# Patient Record
Sex: MALE | Race: WHITE
[De-identification: names, ages, dates, MRNs, and addresses within clinical notes are randomized per-mention and may not be internally consistent; named-entity substitution may affect disease eponyms.]

---

## 2019-08-12 ENCOUNTER — HOSPITAL ENCOUNTER (OUTPATIENT)
Dept: HOSPITAL 11 - JP.SDS | Age: 54
Discharge: HOME | End: 2019-08-12
Attending: SURGERY
Payer: COMMERCIAL

## 2019-08-12 DIAGNOSIS — F32.9: ICD-10-CM

## 2019-08-12 DIAGNOSIS — E78.5: ICD-10-CM

## 2019-08-12 DIAGNOSIS — Z86.711: ICD-10-CM

## 2019-08-12 DIAGNOSIS — K21.9: ICD-10-CM

## 2019-08-12 DIAGNOSIS — K81.1: Primary | ICD-10-CM

## 2019-08-12 DIAGNOSIS — I89.8: ICD-10-CM

## 2019-08-12 DIAGNOSIS — I10: ICD-10-CM

## 2019-08-12 DIAGNOSIS — Z79.01: ICD-10-CM

## 2019-08-12 DIAGNOSIS — Z79.899: ICD-10-CM

## 2019-08-12 PROCEDURE — 80076 HEPATIC FUNCTION PANEL: CPT

## 2019-08-12 PROCEDURE — 85610 PROTHROMBIN TIME: CPT

## 2019-08-12 PROCEDURE — 87070 CULTURE OTHR SPECIMN AEROBIC: CPT

## 2019-08-12 PROCEDURE — 87075 CULTR BACTERIA EXCEPT BLOOD: CPT

## 2019-08-12 PROCEDURE — 87205 SMEAR GRAM STAIN: CPT

## 2019-08-12 PROCEDURE — 47562 LAPAROSCOPIC CHOLECYSTECTOMY: CPT

## 2019-08-12 PROCEDURE — 36415 COLL VENOUS BLD VENIPUNCTURE: CPT

## 2019-08-12 NOTE — [id number]
**Discharge Summary





- Hospital Course


Free Text/Narrative:: 





This 54 year old white male complains of upper abdominal pain.  CT of his 

abdomen and pelvis showed no cholelithiasis and no ductal dilatation.  A CCK 

stimulated HIDA showed a low ejection fraction of 16% consistent with biliary 

dyskinesia and chronic cholelithiasis.  His LFT's are unremarkable.  He is 

admitted for a laparoscopic cholecystectomy.  This was done this morning.  He 

currently is eating well, feels well and wants to go home.  He is discharged at 

this time in good condition. 


Brief History: See above narrative.


Diagnosis: Stroke: No





- Discharge Data


Discharge Date: 08/12/19


Discharge Disposition: Home, Self-Care 01


Condition: Good





- Discharge Diagnosis/Problem(s)


(1) Biliary dyskinesia


SNOMED Code(s): 194421389


   ICD Code: K82.8 - OTHER SPECIFIED DISEASES OF GALLBLADDER   Status: Acute   

Current Visit: Yes   





- Patient Summary/Data


Operative Procedure(s) Performed: Laparoscopic cholecystectomy


Consults: 


 Consultations





08/12/19 11:23


Respiratory Care Assess and Treatment [CONS] Routine 


   Comment: 


   Physician Instructions: Post-Op Pneumonia Prevention











Hospital Course: 





See above narrative. 





- Patient Instructions


Diet: Usual Diet as Tolerated


Activity: As Tolerated (Avoid activity that causes discomfort)


Driving, Other: Do not drive while taking narcotic pain medication. 


Showering/Bathing: No Tub Bathing/Swimming, Shower in AM


Notify Provider of: Fever, Increased Pain, Swelling and Redness, Drainage, 

Nausea and/or Vomiting





- Discharge Plan


*PRESCRIPTION DRUG MONITORING PROGRAM REVIEWED*: No


*COPY OF PRESCRIPTION DRUG MONITORING REPORT IN PATIENT KHALIF: No


Prescriptions/Med Rec: 


Acetaminophen/HYDROcodone [Norco 325-5 MG] 2 tab PO Q4H PRN #30 tablet


 PRN Reason: Pain (Moderate 4-6)


Home Medications: 


 Home Meds





Lisinopril [Prinivil] 20 mg PO DAILY 08/08/19 [History]


Warfarin [Coumadin] 10 mg PO DAILY 08/08/19 [History]


Acetaminophen/HYDROcodone [Norco 325-5 MG] 2 tab PO Q4H PRN #30 tablet 08/12/19 

[Rx]








Referrals: 


Erwin Busby MD [Physician] - 





- Discharge Summary/Plan Comment


DC Time >30 min.: Yes





- General Info


Date of Service: 08/12/19


Admission Dx/Problem (Free Text: 





Chronic cholecystitis and biliary dyskinesia. 


Subjective Update: 





Doing fine.  He wants to go home. 





- Review of Systems


General: Reports: No Symptoms


HEENT: Reports: No Symptoms


Pulmonary: Reports: No Symptoms


Cardiovascular: Reports: No Symptoms


Gastrointestinal: Reports: Abdominal Pain (He says it is not bad. )


Genitourinary: Reports: No Symptoms


Musculoskeletal: Reports: No Symptoms


Skin: Reports: No Symptoms


Neurological: Reports: No Symptoms


Psychiatric: Reports: No Symptoms





- Patient Data


Vitals - Most Recent: 


 Last Vital Signs











Temp  98 F   08/12/19 15:56


 


Pulse  70   08/12/19 15:56


 


Resp  16   08/12/19 15:56


 


BP  122/72   08/12/19 15:56


 


Pulse Ox  97   08/12/19 15:56











Weight - Most Recent: 240 lb


I&O - Last 24 hours: 


 Intake & Output











 08/12/19 08/12/19 08/12/19





 06:59 14:59 22:59


 


Intake Total   844


 


Balance   844











Lab Results - Last 24 hrs: 


 Laboratory Results - last 24 hr











  08/12/19 08/12/19 Range/Units





  08:11 08:11 


 


PT  12.3 H   (9.5-12.0)  sec


 


INR  1.15  D   (0.80-1.20)  


 


Total Bilirubin   0.7  (0.2-1.0)  mg/dL


 


Direct Bilirubin   0.11  (0.0-0.2)  mg/dL


 


Indirect Bilirubin   0.59  


 


AST   22  (15-37)  U/L


 


ALT   29  (12-78)  U/L


 


Alkaline Phosphatase   65  ()  U/L


 


Total Protein   7.7  (6.4-8.2)  g/dL


 


Albumin   4.0  (3.4-5.0)  g/dL


 


Globulin   3.7 H  (2.3-3.5)  g/dL


 


Albumin/Globulin Ratio   1.1 L  (1.2-2.2)  











NASIR Results - Last 24 hrs: 


 Microbiology











 08/12/19 10:52 Gram Stain - Final





 Gallbladder Fluid - Bile 











Med Orders - Current: 


 Current Medications





Hydrocodone Bitart/Acetaminophen (Norco 325-5 Mg)  2 tab PO Q4H PRN


   PRN Reason: Pain (moderate 4-6)


   Last Admin: 08/12/19 15:46 Dose:  2 tab


Fentanyl (Sublimaze)  50 mcg IVPUSH Q1H PRN


   PRN Reason: Pain (severe 7-10)


   Last Admin: 08/12/19 11:40 Dose:  50 mcg


Hydroxyzine HCl (Vistaril)  50 mg IM Q4H PRN


   PRN Reason: Nausea


Dextrose/Lactated Ringer's (Dextrose 5%-Lactated Ringers)  1,000 mls @ 100 mls/

hr IV ASDIRECTED [facility]


   Last Admin: 08/12/19 08:29 Dose:  100 mls/hr


Lactated Ringer's (Ringers, Lactated)  1,000 mls @ 125 mls/hr IV ASDIRECTED [facility]


   Last Admin: 08/12/19 12:17 Dose:  125 mls/hr


Lisinopril (Prinivil)  20 mg PO DAILY [facility]


Ondansetron HCl (Zofran)  4 mg IVPUSH Q6H PRN


   PRN Reason: Nausea/Vomiting





Discontinued Medications





Bupivacaine HCl (Marcaine 0.5%) Confirm Administered Dose 50 ml .ROUTE .STK-MED 

ONE


   Stop: 08/12/19 09:06


   Last Admin: 08/12/19 10:41 Dose:  20 ml


Bupivacaine HCl/Epinephrine Bitart (Marcaine 0.5%/Epinephrine 1:200,000) 

Confirm Administered Dose 50 ml .ROUTE .STK-MED ONE


   Stop: 08/12/19 09:04


Dexamethasone (Dexamethasone) Confirm Administered Dose 4 mg .ROUTE .STK-MED ONE


   Stop: 08/12/19 10:01


Fentanyl (Sublimaze) Confirm Administered Dose 250 mcg .ROUTE .STK-MED ONE


   Stop: 08/12/19 10:00


Fentanyl (Sublimaze)  50 mcg IVPUSH ONETIME ONE


   Stop: 08/12/19 11:38


   Last Admin: 08/12/19 14:10 Dose:  Not Given


Glycopyrrolate (Robinul) Confirm Administered Dose 1 mg .ROUTE .STK-MED ONE


   Stop: 08/12/19 10:01


Hydroxyzine HCl (Vistaril)  100 mg IM ONETIME ONE


   Stop: 08/12/19 11:28


   Last Admin: 08/12/19 11:33 Dose:  100 mg


Cefoxitin Sodium 2 gm/ Sodium (Chloride)  50 mls @ 100 mls/hr IV ONETIME ONE


   Stop: 08/12/19 09:14


   Last Admin: 08/12/19 12:16 Dose:  Not Given


Lactated Ringer's (Ringers, Lactated) Confirm Administered Dose 1,000 mls @ as 

directed .ROUTE [email or web address] ONE


   Stop: 08/12/19 10:51


Lidocaine/Epinephrine (Xylocaine 1% With Epinephrine 1:100,000) Confirm 

Administered Dose 50 ml .ROUTE .[email or web address] ONE


   Stop: 08/12/19 09:06


   Last Admin: 08/12/19 10:42 Dose:  20 ml


Neostigmine Methylsulfate (Neostigmine) Confirm Administered Dose 5 mg .ROUTE 

[email or web address] ONE


   Stop: 08/12/19 10:01


Ondansetron HCl (Zofran) Confirm Administered Dose 4 mg .ROUTE .[email or web address] ONE


   Stop: 08/12/19 10:01


Propofol (Diprivan  20 Ml) Confirm Administered Dose 200 mg .ROUTE .[email or web address] ONE


   Stop: 08/12/19 10:01


Rocuronium Bromide (Zemuron) Confirm Administered Dose 50 mg .ROUTE .[email or web address] ONE


   Stop: 08/12/19 10:01


Succinylcholine Chloride (Quelicin) Confirm Administered Dose 200 mg .ROUTE [email or web address]

-MED ONE


   Stop: 08/12/19 10:01











- Exam


General: Reports: Alert, Oriented, Cooperative, No Acute Distress


Lungs: Reports: Clear to Auscultation


Cardiovascular: Reports: Regular Rate


Skin: Reports: Warm, Dry


Wound/Incisions: Reports: Healing Well


Psy/Mental Status: Reports: Alert, Normal Affect, Normal Mood





Discharge Operative/Procedures





- Procedures Performed


Arterial Line Indication: hemodynamic monitoring


Operations/Procedure Comment: 





Laparoscopic cholecystectomy;

## 2019-08-13 NOTE — OR
DATE OF PROCEDURE:  08/12/2019

 

PREOPERATIVE DIAGNOSES:  Chronic cholecystitis with biliary dyskinesia.

 

POSTOPERATIVE DIAGNOSIS:  Chronic cholecystitis with biliary dyskinesia.

 

PROCEDURE:  Laparoscopic cholecystectomy.

 

SURGEON:  Erwin Busby MD

 

ANESTHESIA:  General endotracheal.

 

INDICATION:  This 54-year-old white male complains of intermittent episodes of 
upper

abdominal pain.  He was studied with an ultrasound being unremarkable.  A CCK-
stimulated 

HIDA scan was abnormal; however, with an ejection fraction only 16%.  This is 
consistent 

with biliary dyskinesia and chronic cholecystitis.  Liver functions were 
unremarkable.  He 

was taken to the operating room for a laparoscopic cholecystectomy.  He has had 
no prior

abdominal surgery.  I counseled him for the procedure, including risks and 
alternatives, and

he gave his informed consent to proceed.  He does take Eliquis.  He has been 
off the

Eliquis.  His coagulation studies are unremarkable at the present time.

 

DESCRIPTION OF PROCEDURE:  After adequate general endotracheal anesthesia was 
obtained, 

the patient's abdomen was prepped and draped in the usual sterile fashion.  Time
-out was 

held.  An infraumbilical semicircular incision was made.  Under direct vision, 
a 12-mm port was

introduced into the abdomen through this incision using the Optiview technique.
  The camera

was introduced into the abdomen, and the abdomen was insufflated to a pressure 
of 15 mmHg

with carbon dioxide.  No evidence of intraabdominal injury was seen.  Under 
direct vision, a

12-mm port was placed in the epigastrium and a 5-mm port was placed in the 
right lower

quadrant.  The gallbladder was grasped and elevated.  The cystic duct and 
arteries were

dissected free.  They were each clipped up on the gallbladder and several times 
proximally

and divided.  The gallbladder was then dissected free from the gallbladder bed 
using Bovie

electrocautery.  During this process, we opened the gallbladder and aspirated 
the contents

free.  No stones were encountered.  The gallbladder was then placed in a sample 
retrieval

bag and elevated up through the anterior abdominal wall via the epigastric port 
site.  The

epigastric port was reintroduced back into the abdomen.  The gallbladder bed 
was irrigated

and suctioned dry.  There was very faint very minimal bleeding in the area of 
the divided

cystic artery and duct.  We placed FloSeal over this because he will be going 
back on

Eliquis.  All looked well.  The epigastric port was removed with the fascial 
closure device

used to close the fascia here with 0 Vicryl.  The infraumbilical port was then 
removed with an

interrupted stitch of 0 Vicryl used to close this fascial defect.  We evacuated 
as much CO2

as we could from the abdomen via the 5-mm port site in the right lower quadrant
, and then

this port was removed.  Lidocaine 1% with epinephrine in a 50:50 mix with 0.5% 
Marcaine 

was infiltrated about all incisions.  4-0 Vicryl using a subcuticular stitch 
was placed to

approximate the skin incisions.  Dermabond was applied.  The anesthesia was 
reversed.  

He was extubated and brought to recovery room in good condition.

 

 

 

 

Erwin Busby MD

DD:  08/12/2019 16:20:52

DT:  08/12/2019 23:53:52

Job #:  [id number]

[email or web address]